# Patient Record
Sex: FEMALE | Race: WHITE | URBAN - METROPOLITAN AREA
[De-identification: names, ages, dates, MRNs, and addresses within clinical notes are randomized per-mention and may not be internally consistent; named-entity substitution may affect disease eponyms.]

---

## 2020-12-15 ENCOUNTER — OFFICE VISIT (OUTPATIENT)
Dept: URBAN - METROPOLITAN AREA CLINIC 36 | Facility: CLINIC | Age: 75
End: 2020-12-15
Payer: MEDICARE

## 2020-12-15 DIAGNOSIS — H34.8310 TRIBUTARY (BRANCH) RETINAL VEIN OCCLUSION, RIGHT EYE, WITH MACULAR EDEMA: Primary | ICD-10-CM

## 2020-12-15 PROCEDURE — 92014 COMPRE OPH EXAM EST PT 1/>: CPT | Performed by: OPHTHALMOLOGY

## 2020-12-15 PROCEDURE — 92134 CPTRZ OPH DX IMG PST SGM RTA: CPT | Performed by: OPHTHALMOLOGY

## 2020-12-15 PROCEDURE — 67028 INJECTION EYE DRUG: CPT | Performed by: OPHTHALMOLOGY

## 2020-12-15 ASSESSMENT — INTRAOCULAR PRESSURE
OD: 15
OS: 12

## 2020-12-15 NOTE — IMPRESSION/PLAN
Impression: Dry eye syndrome of bilateral lacrimal glands: H04.123. Plan: Patient notes blurring and tearing. Exam demonstrates PEE.   Rec ATs

## 2020-12-15 NOTE — IMPRESSION/PLAN
Impression: Glaucoma: H40.9. OU. Status: Controlled. Plan: IOP stable on drops. No progression of ON appearance. Cont. care with Dr. Felisha Sandoval.

## 2020-12-15 NOTE — IMPRESSION/PLAN
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 OD. Status: Symptomatic.
-s/p Avastin last 02/14/18
-s/p Eylea last 08/25/20 Plan: Exam and OCT reveal worse CME today s/p Eylea 4 mo ago. Discussed R/B/A of tx vs observation. Patient elects continuing with [facility] today and will taper to 3 mo to prevent recurrence. R/B/A's discussed. Patient elects to proceed. The patient elects to proceed with [facility], which was performed in the clinic without complication.  

Return to clinic 3 mo for DFE with OCt,OU/poss Eylea OD x BRVO

## 2021-03-16 ENCOUNTER — OFFICE VISIT (OUTPATIENT)
Dept: URBAN - METROPOLITAN AREA CLINIC 36 | Facility: CLINIC | Age: 76
End: 2021-03-16
Payer: MEDICARE

## 2021-03-16 PROCEDURE — 92012 INTRM OPH EXAM EST PATIENT: CPT | Performed by: OPHTHALMOLOGY

## 2021-03-16 PROCEDURE — 67028 INJECTION EYE DRUG: CPT | Performed by: OPHTHALMOLOGY

## 2021-03-16 PROCEDURE — 92134 CPTRZ OPH DX IMG PST SGM RTA: CPT | Performed by: OPHTHALMOLOGY

## 2021-03-16 ASSESSMENT — INTRAOCULAR PRESSURE
OS: 12
OD: 13

## 2021-03-16 NOTE — IMPRESSION/PLAN
Impression: Glaucoma: H40.9. OU. Status: Controlled. Plan: IOP stable on drops. No progression of ON appearance. Cont. care with Dr. Cha Patton.

## 2021-03-16 NOTE — IMPRESSION/PLAN
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 OD. Status: Symptomatic.
-s/p Avastin last 02/14/18
-s/p Eylea last 12/15/20 Plan: Exam and OCT reveal improved CME today tapering s/p Eylea from 4 mo to 3 mo. Discussed R/B/A of tx vs observation. Patient elects continuing with [facility] today and will taper to 3 mo to prevent recurrence. R/B/A's discussed. Patient elects to proceed. The patient elects to proceed with [facility], which was performed in the clinic without complication.  

Return to clinic 3 mo for DFE with OCt,OU/poss Eylea OD x BRVO

## 2021-06-15 ENCOUNTER — OFFICE VISIT (OUTPATIENT)
Dept: URBAN - METROPOLITAN AREA CLINIC 36 | Facility: CLINIC | Age: 76
End: 2021-06-15
Payer: MEDICARE

## 2021-06-15 PROCEDURE — 92134 CPTRZ OPH DX IMG PST SGM RTA: CPT | Performed by: OPHTHALMOLOGY

## 2021-06-15 PROCEDURE — 92014 COMPRE OPH EXAM EST PT 1/>: CPT | Performed by: OPHTHALMOLOGY

## 2021-06-15 PROCEDURE — 67028 INJECTION EYE DRUG: CPT | Performed by: OPHTHALMOLOGY

## 2021-06-15 RX ORDER — LATANOPROST 50 UG/ML
0.005 % SOLUTION OPHTHALMIC
Qty: 5 | Refills: 3 | Status: INACTIVE
Start: 2021-06-15 | End: 2021-08-30

## 2021-06-15 ASSESSMENT — INTRAOCULAR PRESSURE
OS: 11
OD: 14

## 2021-06-15 NOTE — IMPRESSION/PLAN
Impression: Glaucoma: H40.9. OU. Status: Controlled. Plan: IOP stable on drops. No progression of ON appearance. Cont. care with Dr. Dedra Rivera.

## 2021-06-15 NOTE — IMPRESSION/PLAN
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 OD. Status: Symptomatic.
-s/p Avastin last 02/14/18
-s/p Eylea last 03/16/2021 Plan: Exam and OCT reveal worse CME today tapering s/p Eylea from 3 mo. Discussed R/B/A of tx vs observation. Patient elects continuing with [facility] today and will taper to 10 wks to prevent recurrence. R/B/A's discussed. Patient elects to proceed. The patient elects to proceed with [facility], which was performed in the clinic without complication.  

Return to clinic 10 wks for DFE with OCt,OU/poss Eylea OD x BRVO

## 2021-08-24 ENCOUNTER — OFFICE VISIT (OUTPATIENT)
Dept: URBAN - METROPOLITAN AREA CLINIC 36 | Facility: CLINIC | Age: 76
End: 2021-08-24
Payer: MEDICARE

## 2021-08-24 DIAGNOSIS — H02.409 PTOSIS OF EYELID: ICD-10-CM

## 2021-08-24 PROCEDURE — 67028 INJECTION EYE DRUG: CPT | Performed by: OPHTHALMOLOGY

## 2021-08-24 PROCEDURE — 92134 CPTRZ OPH DX IMG PST SGM RTA: CPT | Performed by: OPHTHALMOLOGY

## 2021-08-24 PROCEDURE — 92014 COMPRE OPH EXAM EST PT 1/>: CPT | Performed by: OPHTHALMOLOGY

## 2021-08-24 ASSESSMENT — INTRAOCULAR PRESSURE
OD: 13
OS: 13

## 2021-08-24 NOTE — IMPRESSION/PLAN
Impression: Glaucoma: H40.9. OU. Status: Controlled. Plan: IOP stable on drops. No progression of ON appearance. Cont. care with Dr. Олег Callahan.

## 2021-08-24 NOTE — IMPRESSION/PLAN
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 OD. Status: Symptomatic.
-s/p Avastin last 02/14/18
-s/p Eylea last 03/16/2021 Plan: Exam and OCT reveal improved CME today tapering s/p Eylea from 3 mo to 10 wks. Discussed R/B/A of tx vs observation. Patient elects continuing with [facility] today and will continue with 10 wks to prevent recurrence. R/B/A's discussed. Patient elects to proceed. The patient elects to proceed with [facility], which was performed in the clinic without complication.  

Return to clinic 10 wks for DFE with OCt,OU/poss Eylea OD x BRVO

## 2021-08-24 NOTE — IMPRESSION/PLAN
Impression: Presence of intraocular lens: Z96.1. Plan: Patient notes a decline in vision. Exam demonstrates a worse PCO.   Rec follow up for YAG laser OS>OD

## 2021-08-24 NOTE — IMPRESSION/PLAN
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  Plan: s/p repair (Dr. Carlos Eduardo Bedoya); looks great

## 2021-09-29 ENCOUNTER — OFFICE VISIT (OUTPATIENT)
Dept: URBAN - METROPOLITAN AREA CLINIC 37 | Facility: CLINIC | Age: 76
End: 2021-09-29
Payer: MEDICARE

## 2021-09-29 DIAGNOSIS — H40.1133 PRIMARY OPEN-ANGLE GLAUCOMA, BILATERAL, SEVERE STAGE: ICD-10-CM

## 2021-09-29 PROCEDURE — 99204 OFFICE O/P NEW MOD 45 MIN: CPT | Performed by: OPTOMETRIST

## 2021-09-29 ASSESSMENT — INTRAOCULAR PRESSURE
OS: 11
OD: 11

## 2021-09-29 ASSESSMENT — VISUAL ACUITY
OD: 20/25
OS: 20/30

## 2021-09-29 NOTE — IMPRESSION/PLAN
Impression: Primary open-angle glaucoma, bilateral, severe stage: V59.7721. Plan: cont w gtts ans stay w Dr Micheal Gaitan.

## 2021-09-29 NOTE — IMPRESSION/PLAN
Impression: Other secondary cataract, bilateral: H26.493. Plan: refer for YAG OS >>OD.   Decreased VA OS sec to PCO

## 2021-11-08 ENCOUNTER — OFFICE VISIT (OUTPATIENT)
Dept: URBAN - METROPOLITAN AREA CLINIC 36 | Facility: CLINIC | Age: 76
End: 2021-11-08
Payer: MEDICARE

## 2021-11-08 DIAGNOSIS — Z96.1 PRESENCE OF INTRAOCULAR LENS: ICD-10-CM

## 2021-11-08 DIAGNOSIS — H04.123 DRY EYE SYNDROME OF BILATERAL LACRIMAL GLANDS: ICD-10-CM

## 2021-11-08 PROCEDURE — 92014 COMPRE OPH EXAM EST PT 1/>: CPT | Performed by: OPHTHALMOLOGY

## 2021-11-08 PROCEDURE — 92134 CPTRZ OPH DX IMG PST SGM RTA: CPT | Performed by: OPHTHALMOLOGY

## 2021-11-08 PROCEDURE — 67028 INJECTION EYE DRUG: CPT | Performed by: OPHTHALMOLOGY

## 2021-11-08 ASSESSMENT — INTRAOCULAR PRESSURE
OD: 14
OS: 13

## 2021-11-08 NOTE — IMPRESSION/PLAN
Impression: Glaucoma: H40.9. OU. Status: Controlled. Plan: IOP stable on drops. No progression of ON appearance. Cont. care with Dr. Eric Boykin.

## 2021-11-08 NOTE — IMPRESSION/PLAN
Impression: Presence of intraocular lens: Z96.1. Plan: Patient notes a decline in vision. Exam demonstrates a worse PCO.   Cleared from retina standpoint to proceed with YAG laser OU

## 2021-11-08 NOTE — IMPRESSION/PLAN
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 OD. Status: Symptomatic.
-s/p Avastin last 02/14/18
-s/p Eylea last 08/24/2021 Plan: Exam and OCT reveal recurrence of CME today s/p Eylea extending from 10wks to 11 wks. Discussed R/B/A of tx vs observation. Patient elects continuing with [facility] today and will taper with 10 wks to prevent recurrence. R/B/A's discussed. Patient elects to proceed. The patient elects to proceed with [facility], which was performed in the clinic without complication.  

Return to clinic 10 wks for DFE with OCT OU/poss Eylea OD x BRVO

## 2021-11-18 ENCOUNTER — TESTING ONLY (OUTPATIENT)
Dept: URBAN - METROPOLITAN AREA CLINIC 33 | Facility: CLINIC | Age: 76
End: 2021-11-18
Payer: MEDICARE

## 2021-11-18 DIAGNOSIS — H26.492 OTHER SECONDARY CATARACT, LEFT EYE: ICD-10-CM

## 2021-11-18 PROCEDURE — 99203 OFFICE O/P NEW LOW 30 MIN: CPT | Performed by: OPHTHALMOLOGY

## 2021-11-18 RX ORDER — PREDNISOLONE ACETATE 10 MG/ML
1 % SUSPENSION/ DROPS OPHTHALMIC
Qty: 10 | Refills: 1 | Status: ACTIVE
Start: 2021-11-18

## 2021-11-18 ASSESSMENT — INTRAOCULAR PRESSURE
OS: 15
OD: 15

## 2021-11-18 NOTE — IMPRESSION/PLAN
Impression: Other secondary cataract, bilateral: H26.493. Plan: Posterior capsular opacification present on lens implant and causing decreased vision. Proceed with YAG capsulotomy treatment  OS then OD . Discussed R/B/I  with patient. 
 USE Pred QID for 1 week after Laser Procedure ( each eye) 0 = independent

## 2022-01-17 ENCOUNTER — OFFICE VISIT (OUTPATIENT)
Dept: URBAN - METROPOLITAN AREA CLINIC 36 | Facility: CLINIC | Age: 77
End: 2022-01-17
Payer: MEDICARE

## 2022-01-17 DIAGNOSIS — H40.9 GLAUCOMA: ICD-10-CM

## 2022-01-17 PROCEDURE — 67028 INJECTION EYE DRUG: CPT | Performed by: OPHTHALMOLOGY

## 2022-01-17 PROCEDURE — 92134 CPTRZ OPH DX IMG PST SGM RTA: CPT | Performed by: OPHTHALMOLOGY

## 2022-01-17 PROCEDURE — 92014 COMPRE OPH EXAM EST PT 1/>: CPT | Performed by: OPHTHALMOLOGY

## 2022-01-17 ASSESSMENT — INTRAOCULAR PRESSURE
OD: 12
OS: 12

## 2022-01-17 NOTE — IMPRESSION/PLAN
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 OD. Status: Symptomatic.
-s/p Avastin last 02/14/18
-s/p Eylea last 08/24/2021 Plan: Exam and OCT reveal improved but persistent CME today s/p Eylea tapering from 11 wks to 10 wks. Discussed R/B/A of tx vs observation. Patient elects continuing with [facility] today and will taper to 9 wks to prevent recurrence. R/B/A's discussed. Patient elects to proceed. The patient elects to proceed with [facility], which was performed in the clinic without complication.  

Return to clinic 9 wks for DFE with OCT OU/poss Eylea OD x BRVO

## 2022-01-17 NOTE — IMPRESSION/PLAN
Impression: Presence of intraocular lens: Z96.1. Plan: Patient notes a decline in vision. Exam demonstrates a worse PCO. Cleared from retina standpoint to proceed with YAG laser OU. Retina appears healthy enough to achieve better vision but for the PCO.

## 2022-01-21 ENCOUNTER — TESTING ONLY (OUTPATIENT)
Dept: URBAN - METROPOLITAN AREA CLINIC 33 | Facility: CLINIC | Age: 77
End: 2022-01-21
Payer: MEDICARE

## 2022-01-21 DIAGNOSIS — H26.493 OTHER SECONDARY CATARACT, BILATERAL: Primary | ICD-10-CM

## 2022-01-21 PROCEDURE — V2799 MISC VISION ITEM OR SERVICE: HCPCS | Performed by: OPTOMETRIST

## 2022-01-21 ASSESSMENT — KERATOMETRY
OS: 45.13
OD: 45.13

## 2022-01-21 ASSESSMENT — INTRAOCULAR PRESSURE
OS: 13
OD: 10

## 2022-01-21 ASSESSMENT — VISUAL ACUITY
OD: 20/70
OS: 20/100

## 2022-01-21 NOTE — IMPRESSION/PLAN
Impression: Other secondary cataract, bilateral: H26.493. Plan: Posterior capsular opacification present on lens implant and causing decreased vision. Educated patient about the pathophysiology of today's findings and recommend YAG capsulotomy treatment with cataract surgeon. Recommend YAG OS then OD.

## 2022-02-15 ENCOUNTER — SURGERY (OUTPATIENT)
Dept: URBAN - METROPOLITAN AREA SURGERY 15 | Facility: SURGERY | Age: 77
End: 2022-02-15
Payer: MEDICARE

## 2022-02-15 PROCEDURE — 66821 AFTER CATARACT LASER SURGERY: CPT | Performed by: OPHTHALMOLOGY

## 2022-02-22 ENCOUNTER — POST-OPERATIVE VISIT (OUTPATIENT)
Dept: URBAN - METROPOLITAN AREA CLINIC 33 | Facility: CLINIC | Age: 77
End: 2022-02-22

## 2022-02-22 DIAGNOSIS — Z48.810 ENCOUNTER FOR SURGICAL AFTERCARE FOLLOWING SURGERY ON A SENSE ORGAN: Primary | ICD-10-CM

## 2022-02-22 PROCEDURE — 99024 POSTOP FOLLOW-UP VISIT: CPT | Performed by: OPTOMETRIST

## 2022-02-22 ASSESSMENT — INTRAOCULAR PRESSURE
OS: 15
OD: 14

## 2022-02-22 NOTE — IMPRESSION/PLAN
Impression: S/P YAG Capsulotomy (Yttrium Aluminum [place]) OS - 7 Days. Encounter for surgical aftercare following surgery on a sense organ  Z48.810. Plan: Keep YAG OD --Advised patient to use artificial tears for comfort.

## 2022-03-21 ENCOUNTER — OFFICE VISIT (OUTPATIENT)
Dept: URBAN - METROPOLITAN AREA CLINIC 36 | Facility: CLINIC | Age: 77
End: 2022-03-21
Payer: MEDICARE

## 2022-03-21 PROCEDURE — 92012 INTRM OPH EXAM EST PATIENT: CPT | Performed by: OPHTHALMOLOGY

## 2022-03-21 PROCEDURE — 67028 INJECTION EYE DRUG: CPT | Performed by: OPHTHALMOLOGY

## 2022-03-21 PROCEDURE — 92134 CPTRZ OPH DX IMG PST SGM RTA: CPT | Performed by: OPHTHALMOLOGY

## 2022-03-21 ASSESSMENT — INTRAOCULAR PRESSURE
OS: 16
OD: 17

## 2022-03-21 NOTE — IMPRESSION/PLAN
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 OD. Status: Symptomatic.
-s/p Avastin last 02/14/18
-s/p Eylea last 01/17/22 Plan: Exam and OCT reveal improved but persistent CME today s/p Eylea tapering from 11 wks to 10 wks. Discussed R/B/A of tx vs observation. Patient elects continuing with [facility] today and will taper to 9 wks to prevent recurrence. R/B/A's discussed. Patient elects to proceed. The patient elects to proceed with [facility], which was performed in the clinic without complication.  

Return to clinic 9 wks for DFE with OCT OU/poss Eylea OD x BRVO

## 2022-03-21 NOTE — IMPRESSION/PLAN
Impression: Presence of intraocular lens: Z96.1.
s/p YAG OS 02/15/22 Plan: Lens centered.  Cleared to proceed with YAG OD.

## 2022-03-21 NOTE — IMPRESSION/PLAN
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
-s/p repair OS (Dr. Rony Patterson) for asymmetry Plan: Exam demonstrates dermatochalasis with ptosis OU. This may be starting to become visually significant as patient is manually trying to raise her eyelids. Will refer back to Dr. Jonn Singer for evaluation.

## 2022-03-21 NOTE — IMPRESSION/PLAN
Impression: Glaucoma: H40.9. OU. Status: Controlled. Plan: IOP stable on drops. No progression of ON appearance. Cont. care with Dr. Yesenia Mandujano.

## 2022-05-23 ENCOUNTER — OFFICE VISIT (OUTPATIENT)
Dept: URBAN - METROPOLITAN AREA CLINIC 36 | Facility: CLINIC | Age: 77
End: 2022-05-23
Payer: MEDICARE

## 2022-05-23 DIAGNOSIS — H04.123 DRY EYE SYNDROME OF BILATERAL LACRIMAL GLANDS: ICD-10-CM

## 2022-05-23 DIAGNOSIS — H40.9 GLAUCOMA: ICD-10-CM

## 2022-05-23 DIAGNOSIS — Z96.1 PRESENCE OF INTRAOCULAR LENS: ICD-10-CM

## 2022-05-23 DIAGNOSIS — H02.409 PTOSIS OF EYELID: ICD-10-CM

## 2022-05-23 DIAGNOSIS — H34.8310 TRIB RTNL VEIN OCCLUSION, RIGHT EYE, WITH MACULAR EDEMA: Primary | ICD-10-CM

## 2022-05-23 PROCEDURE — 92012 INTRM OPH EXAM EST PATIENT: CPT | Performed by: OPHTHALMOLOGY

## 2022-05-23 PROCEDURE — 92134 CPTRZ OPH DX IMG PST SGM RTA: CPT | Performed by: OPHTHALMOLOGY

## 2022-05-23 PROCEDURE — 67028 INJECTION EYE DRUG: CPT | Performed by: OPHTHALMOLOGY

## 2022-05-23 ASSESSMENT — INTRAOCULAR PRESSURE
OD: 16
OS: 13

## 2022-05-23 NOTE — IMPRESSION/PLAN
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 OD. Status: Symptomatic.
-s/p Avastin last 02/14/18
-s/p Eylea last 01/17/22 Plan: Exam and OCT reveal improved but persistent CME today s/p Eylea tapering from 11 wks to 10 wks. Discussed R/B/A of tx vs observation. Patient elects continuing with [facility] today and will taper to 9 wks to prevent recurrence. R/B/A's discussed. Patient elects to proceed. The patient elects to proceed with [facility], which was performed in the clinic without complication.  

Return to clinic 9 wks for DFE with OCT OU/poss Eylea OD x BRVO

## 2022-05-23 NOTE — IMPRESSION/PLAN
Impression: Glaucoma: H40.9. OU. Status: Controlled. Plan: IOP stable on latanoprost. No progression of ON appearance. Cont. care with Dr. Darci Rangel.

## 2022-05-23 NOTE — IMPRESSION/PLAN
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
-s/p repair OS (Dr. Graham Pires) for asymmetry Plan: Exam demonstrates dermatochalasis with ptosis OU. This may be starting to become visually significant as patient is manually trying to raise her eyelids. Will refer back to Dr. Sandra Michaels for evaluation.

## 2022-05-24 ENCOUNTER — SURGERY (OUTPATIENT)
Dept: URBAN - METROPOLITAN AREA SURGERY 15 | Facility: SURGERY | Age: 77
End: 2022-05-24
Payer: MEDICARE

## 2022-05-24 PROCEDURE — 66821 AFTER CATARACT LASER SURGERY: CPT | Performed by: OPHTHALMOLOGY

## 2022-05-31 ENCOUNTER — POST-OPERATIVE VISIT (OUTPATIENT)
Dept: URBAN - METROPOLITAN AREA CLINIC 33 | Facility: CLINIC | Age: 77
End: 2022-05-31
Payer: MEDICARE

## 2022-05-31 DIAGNOSIS — Z48.810 ENCOUNTER FOR SURGICAL AFTERCARE FOLLOWING SURGERY ON A SENSE ORGAN: Primary | ICD-10-CM

## 2022-05-31 PROCEDURE — 99024 POSTOP FOLLOW-UP VISIT: CPT | Performed by: OPTOMETRIST

## 2022-05-31 ASSESSMENT — INTRAOCULAR PRESSURE
OD: 16
OS: 16

## 2022-05-31 ASSESSMENT — VISUAL ACUITY: OD: 20/30

## 2022-05-31 NOTE — IMPRESSION/PLAN
Impression: S/P YAG Capsulotomy (Yttrium Aluminum [place]) OD - 7 Days. Encounter for surgical aftercare following surgery on a sense organ  Z48.810. Patient reports improved vision. Continue care with Dr. Josue Lopez.  Plan: D/C Prednisolone

## 2022-07-25 ENCOUNTER — OFFICE VISIT (OUTPATIENT)
Dept: URBAN - METROPOLITAN AREA CLINIC 36 | Facility: CLINIC | Age: 77
End: 2022-07-25
Payer: MEDICARE

## 2022-07-25 DIAGNOSIS — H34.8310 TRIB RTNL VEIN OCCLUSION, RIGHT EYE, WITH MACULAR EDEMA: Primary | ICD-10-CM

## 2022-07-25 DIAGNOSIS — H40.9 GLAUCOMA: ICD-10-CM

## 2022-07-25 DIAGNOSIS — H02.409 PTOSIS OF EYELID: ICD-10-CM

## 2022-07-25 DIAGNOSIS — Z96.1 PRESENCE OF INTRAOCULAR LENS: ICD-10-CM

## 2022-07-25 DIAGNOSIS — H04.123 DRY EYE SYNDROME OF BILATERAL LACRIMAL GLANDS: ICD-10-CM

## 2022-07-25 PROCEDURE — 92012 INTRM OPH EXAM EST PATIENT: CPT | Performed by: OPHTHALMOLOGY

## 2022-07-25 PROCEDURE — 67028 INJECTION EYE DRUG: CPT | Performed by: OPHTHALMOLOGY

## 2022-07-25 PROCEDURE — 92134 CPTRZ OPH DX IMG PST SGM RTA: CPT | Performed by: OPHTHALMOLOGY

## 2022-07-25 ASSESSMENT — INTRAOCULAR PRESSURE
OS: 11
OD: 12

## 2022-07-25 NOTE — IMPRESSION/PLAN
Impression: Glaucoma: H40.9. OU. Status: Controlled. Plan: IOP stable on latanoprost. No progression of ON appearance. Cont. care with Dr. Tati Reeves.

## 2022-07-25 NOTE — IMPRESSION/PLAN
Impression: Presence of intraocular lens: Z96.1.
s/p YAG OS 02/15/22, OD 5/24/2022 Plan: Lens centered.  Vision improved after YAG

## 2022-07-25 NOTE — IMPRESSION/PLAN
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 OD. Status: Symptomatic.
-s/p Avastin last 02/14/18
-s/p Eylea last 05/23/2022 Plan: Exam and OCT reveal improved but persistent CME today s/p Eylea tapering from 11 wks to 10 wks. Discussed R/B/A of tx vs observation. Patient elects continuing with [facility] today and will taper to 9 wks to prevent recurrence. R/B/A's discussed. Patient elects to proceed. The patient elects to proceed with [facility], which was performed in the clinic without complication.  

Return to clinic 9 wks for DFE with OCT OU/poss Eylea OD x BRVO

## 2022-07-25 NOTE — IMPRESSION/PLAN
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
-s/p repair OS (Dr. Marcos Yang) for asymmetry Plan: Exam demonstrates dermatochalasis with ptosis OU. This may be starting to become visually significant as patient is manually trying to raise her eyelids. Will refer back to Dr. Nelson Diaz for evaluation.

## 2022-09-26 ENCOUNTER — OFFICE VISIT (OUTPATIENT)
Dept: URBAN - METROPOLITAN AREA CLINIC 36 | Facility: CLINIC | Age: 77
End: 2022-09-26
Payer: MEDICARE

## 2022-09-26 DIAGNOSIS — H34.8310 TRIB RTNL VEIN OCCLUSION, RIGHT EYE, WITH MACULAR EDEMA: Primary | ICD-10-CM

## 2022-09-26 DIAGNOSIS — H02.409 PTOSIS OF EYELID: ICD-10-CM

## 2022-09-26 DIAGNOSIS — H40.9 GLAUCOMA: ICD-10-CM

## 2022-09-26 DIAGNOSIS — H04.123 DRY EYE SYNDROME OF BILATERAL LACRIMAL GLANDS: ICD-10-CM

## 2022-09-26 DIAGNOSIS — Z96.1 PRESENCE OF INTRAOCULAR LENS: ICD-10-CM

## 2022-09-26 PROCEDURE — 92134 CPTRZ OPH DX IMG PST SGM RTA: CPT | Performed by: OPHTHALMOLOGY

## 2022-09-26 PROCEDURE — 99214 OFFICE O/P EST MOD 30 MIN: CPT | Performed by: OPHTHALMOLOGY

## 2022-09-26 PROCEDURE — 67028 INJECTION EYE DRUG: CPT | Performed by: OPHTHALMOLOGY

## 2022-09-26 ASSESSMENT — INTRAOCULAR PRESSURE
OS: 15
OD: 14

## 2022-09-26 NOTE — IMPRESSION/PLAN
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
-s/p repair OS (Dr. Sera Westbrook) for asymmetry Plan: Exam demonstrates dermatochalasis with ptosis OU. This may be starting to become visually significant as patient is manually trying to raise her eyelids. Will refer back to Dr. Lyle Graves for evaluation.

## 2022-09-26 NOTE — IMPRESSION/PLAN
Impression: Glaucoma: H40.9. OU. Status: Controlled. Plan: IOP stable on latanoprost. No progression of ON appearance. Cont. care with Dr. Yessica Brown.

## 2022-09-26 NOTE — IMPRESSION/PLAN
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 OD. Status: Symptomatic.
-s/p Avastin last 02/14/18
-s/p Eylea last 07/25/2022 Plan: Exam and OCT reveal improved but persistent CME today s/p Eylea tapering from 10 wks to 9 wks. Discussed R/B/A of tx vs observation. Patient elects continuing with [facility] today and will maintain 9 wks to prevent recurrence. R/B/A's discussed. Patient elects to proceed. The patient elects to proceed with [facility], which was performed in the clinic without complication.  

Return to clinic 9 wks for DFE with OCT OU/poss Eylea OD x BRVO

## 2022-11-28 ENCOUNTER — OFFICE VISIT (OUTPATIENT)
Dept: URBAN - METROPOLITAN AREA CLINIC 36 | Facility: CLINIC | Age: 77
End: 2022-11-28
Payer: MEDICARE

## 2022-11-28 DIAGNOSIS — H04.123 DRY EYE SYNDROME OF BILATERAL LACRIMAL GLANDS: ICD-10-CM

## 2022-11-28 DIAGNOSIS — H34.8310 TRIB RTNL VEIN OCCLUSION, RIGHT EYE, WITH MACULAR EDEMA: Primary | ICD-10-CM

## 2022-11-28 DIAGNOSIS — H40.9 GLAUCOMA: ICD-10-CM

## 2022-11-28 DIAGNOSIS — Z96.1 PRESENCE OF INTRAOCULAR LENS: ICD-10-CM

## 2022-11-28 DIAGNOSIS — H02.409 PTOSIS OF EYELID: ICD-10-CM

## 2022-11-28 PROCEDURE — 99214 OFFICE O/P EST MOD 30 MIN: CPT | Performed by: OPHTHALMOLOGY

## 2022-11-28 PROCEDURE — 92134 CPTRZ OPH DX IMG PST SGM RTA: CPT | Performed by: OPHTHALMOLOGY

## 2022-11-28 PROCEDURE — 67028 INJECTION EYE DRUG: CPT | Performed by: OPHTHALMOLOGY

## 2022-11-28 ASSESSMENT — INTRAOCULAR PRESSURE
OD: 15
OS: 19

## 2022-11-28 NOTE — IMPRESSION/PLAN
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
-s/p repair OS (Dr. Sidra Voss) for asymmetry Plan: Exam demonstrates dermatochalasis with ptosis OU. This may be starting to become visually significant as patient is manually trying to raise her eyelids. Will refer back to Dr. Castillo Bull for evaluation.

## 2022-11-28 NOTE — IMPRESSION/PLAN
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 OD. Status: Symptomatic.
-s/p Avastin last 02/14/18
-s/p Eylea last 09/26/2022 Plan: Exam and OCT reveal improved but persistent CME today s/p Eylea tapering from 10 wks to 9 wks. Discussed R/B/A of tx vs observation. Patient elects continuing with [facility] today and will taper to 8 wks to prevent recurrence. R/B/A's discussed. Patient elects to proceed. The patient elects to proceed with [facility], which was performed in the clinic without complication.  

Return to clinic 8wks for DFE with OCT OU (mac and RNFL)/poss Eylea OD x BRVO

## 2022-11-28 NOTE — IMPRESSION/PLAN
Impression: Glaucoma: H40.9. OU. Status: Controlled. Plan: IOP stable on latanoprost. No progression of ON appearance. Cont. care with Dr. Reinaldo Ramey.

## 2023-01-23 ENCOUNTER — OFFICE VISIT (OUTPATIENT)
Dept: URBAN - METROPOLITAN AREA CLINIC 36 | Facility: CLINIC | Age: 78
End: 2023-01-23
Payer: MEDICARE

## 2023-01-23 DIAGNOSIS — H40.9 GLAUCOMA: ICD-10-CM

## 2023-01-23 DIAGNOSIS — Z96.1 PRESENCE OF INTRAOCULAR LENS: ICD-10-CM

## 2023-01-23 DIAGNOSIS — H34.8310 TRIB RTNL VEIN OCCLUSION, RIGHT EYE, WITH MACULAR EDEMA: Primary | ICD-10-CM

## 2023-01-23 DIAGNOSIS — H02.409 PTOSIS OF EYELID: ICD-10-CM

## 2023-01-23 DIAGNOSIS — H04.123 DRY EYE SYNDROME OF BILATERAL LACRIMAL GLANDS: ICD-10-CM

## 2023-01-23 PROCEDURE — 92012 INTRM OPH EXAM EST PATIENT: CPT | Performed by: OPHTHALMOLOGY

## 2023-01-23 PROCEDURE — 67028 INJECTION EYE DRUG: CPT | Performed by: OPHTHALMOLOGY

## 2023-01-23 PROCEDURE — 92134 CPTRZ OPH DX IMG PST SGM RTA: CPT | Performed by: OPHTHALMOLOGY

## 2023-01-23 ASSESSMENT — INTRAOCULAR PRESSURE
OS: 19
OD: 18

## 2023-01-23 NOTE — IMPRESSION/PLAN
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
-s/p repair OS (Dr. Catia Reyes) for asymmetry Plan: Exam demonstrates dermatochalasis with improved ptosis OU. Patient remains very happy with the results.

## 2023-01-23 NOTE — IMPRESSION/PLAN
Impression: Glaucoma: H40.9. OU. Status: Controlled. Plan: IOP stable on latanoprost. No progression of ON appearance. Cont. care with Dr. Grabiel Lynch.

## 2023-01-23 NOTE — IMPRESSION/PLAN
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 OD. Status: Symptomatic.
-s/p Avastin last 02/14/18
-s/p Eylea last 11/28/2022 Plan: Exam and OCT reveal improved but persistent CME today s/p Eylea tapering from 10 wks to 9 wks and now to 8 wks. Discussed R/B/A of tx vs observation. Patient elects continuing with [facility] today and will maintain 8 wks to prevent recurrence. R/B/A's discussed. Patient elects to proceed. The patient elects to proceed with Anita Tafoya, which was performed in the clinic without complication.  

Return to clinic 8wks for DFE with OCT OU (mac and RNFL)/poss Eylea OD x BRVO

## 2023-03-20 ENCOUNTER — OFFICE VISIT (OUTPATIENT)
Dept: URBAN - METROPOLITAN AREA CLINIC 36 | Facility: CLINIC | Age: 78
End: 2023-03-20
Payer: MEDICARE

## 2023-03-20 DIAGNOSIS — H04.123 DRY EYE SYNDROME OF BILATERAL LACRIMAL GLANDS: ICD-10-CM

## 2023-03-20 DIAGNOSIS — H02.409 PTOSIS OF EYELID: ICD-10-CM

## 2023-03-20 DIAGNOSIS — H40.9 GLAUCOMA: ICD-10-CM

## 2023-03-20 DIAGNOSIS — Z96.1 PRESENCE OF INTRAOCULAR LENS: ICD-10-CM

## 2023-03-20 DIAGNOSIS — H34.8310 TRIB RTNL VEIN OCCLUSION, RIGHT EYE, WITH MACULAR EDEMA: Primary | ICD-10-CM

## 2023-03-20 PROCEDURE — 67028 INJECTION EYE DRUG: CPT | Performed by: OPHTHALMOLOGY

## 2023-03-20 PROCEDURE — 92134 CPTRZ OPH DX IMG PST SGM RTA: CPT | Performed by: OPHTHALMOLOGY

## 2023-03-20 PROCEDURE — 92012 INTRM OPH EXAM EST PATIENT: CPT | Performed by: OPHTHALMOLOGY

## 2023-03-20 RX ORDER — LATANOPROST 50 UG/ML
0.005 % SOLUTION OPHTHALMIC
Qty: 5 | Refills: 11 | Status: INACTIVE
Start: 2023-03-20 | End: 2023-03-24

## 2023-03-20 ASSESSMENT — INTRAOCULAR PRESSURE
OD: 17
OS: 17

## 2023-03-20 NOTE — IMPRESSION/PLAN
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 OD. Status: Symptomatic.
-s/p Avastin last 02/14/18
-s/p Eylea last 01/23/2023 Plan: Exam and OCT reveal improved but persistent CME today s/p Eylea tapering from 10 wks to 9 wks and now at 8 wk interval. Discussed R/B/A of tx vs observation. Patient elects continuing with Erin Ramachandran today and will maintain 8 wks to prevent recurrence. R/B/A's discussed. Patient elects to proceed. The patient elects to proceed with Erin Ramachandran, which was performed in the clinic without complication.  

Return to clinic 8wks for DFE with OCT OU (mac and RNFL)/poss Eylea OD x BRVO

## 2023-03-20 NOTE — IMPRESSION/PLAN
Impression: Glaucoma: H40.9. OU. Status: Controlled. Plan: IOP stable on latanoprost. No progression of ON appearance. Cont. care with Dr. Dakota Goldstein.   RNFL ~71 OU with tr thinning 3/20/2023

## 2023-03-20 NOTE — IMPRESSION/PLAN
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
-s/p repair OS (Dr. Laura Marshall) for asymmetry Plan: Exam demonstrates dermatochalasis with improved ptosis OU. Patient remains very happy with the results.

## 2023-05-15 ENCOUNTER — OFFICE VISIT (OUTPATIENT)
Dept: URBAN - METROPOLITAN AREA CLINIC 36 | Facility: CLINIC | Age: 78
End: 2023-05-15
Payer: MEDICARE

## 2023-05-15 DIAGNOSIS — H40.9 GLAUCOMA: ICD-10-CM

## 2023-05-15 DIAGNOSIS — H04.123 DRY EYE SYNDROME OF BILATERAL LACRIMAL GLANDS: ICD-10-CM

## 2023-05-15 DIAGNOSIS — H02.409 PTOSIS OF EYELID: ICD-10-CM

## 2023-05-15 DIAGNOSIS — H34.8310 TRIB RTNL VEIN OCCLUSION, RIGHT EYE, WITH MACULAR EDEMA: Primary | ICD-10-CM

## 2023-05-15 DIAGNOSIS — Z96.1 PRESENCE OF INTRAOCULAR LENS: ICD-10-CM

## 2023-05-15 PROCEDURE — 92012 INTRM OPH EXAM EST PATIENT: CPT | Performed by: OPHTHALMOLOGY

## 2023-05-15 PROCEDURE — 67028 INJECTION EYE DRUG: CPT | Performed by: OPHTHALMOLOGY

## 2023-05-15 PROCEDURE — 92134 CPTRZ OPH DX IMG PST SGM RTA: CPT | Performed by: OPHTHALMOLOGY

## 2023-05-15 ASSESSMENT — INTRAOCULAR PRESSURE
OD: 16
OS: 14

## 2023-05-15 NOTE — IMPRESSION/PLAN
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 OD. Status: Symptomatic.
-s/p Avastin last 02/14/18
-s/p Eylea last 03/20/2023 Plan: Exam and OCT reveal improved but persistent CME today s/p Eylea tapering from 10 wks to 8 wk interval. Discussed R/B/A of tx vs observation. Patient elects continuing with [facility] today and will maintain 8 wks to prevent recurrence. R/B/A's discussed. Patient elects to proceed. The patient elects to proceed with [facility], which was performed in the clinic without complication.  

RTC: 8 wks for DFE with OCT OU (mac and RNFL)/poss Eylea OD x BRVO

## 2023-05-15 NOTE — IMPRESSION/PLAN
Impression: Glaucoma: H40.9. OU. Status: Controlled. Plan: IOP stable on latanoprost. No progression of ON appearance. Refill provided. Cont. care with Dr. Kimber Caba.   RNFL ~71 OU with tr thinning 3/20/2023

## 2023-05-15 NOTE — IMPRESSION/PLAN
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
-s/p repair OS (Dr. Gaby Hinojosa) for asymmetry Plan: Exam demonstrates dermatochalasis with improved ptosis OU. Patient remains very happy with the results.

## 2023-07-10 ENCOUNTER — OFFICE VISIT (OUTPATIENT)
Dept: URBAN - METROPOLITAN AREA CLINIC 36 | Facility: CLINIC | Age: 78
End: 2023-07-10
Payer: MEDICARE

## 2023-07-10 DIAGNOSIS — H02.409 PTOSIS OF EYELID: ICD-10-CM

## 2023-07-10 DIAGNOSIS — H34.8310 TRIB RTNL VEIN OCCLUSION, RIGHT EYE, WITH MACULAR EDEMA: Primary | ICD-10-CM

## 2023-07-10 DIAGNOSIS — H40.9 GLAUCOMA: ICD-10-CM

## 2023-07-10 DIAGNOSIS — Z96.1 PRESENCE OF INTRAOCULAR LENS: ICD-10-CM

## 2023-07-10 DIAGNOSIS — H04.123 DRY EYE SYNDROME OF BILATERAL LACRIMAL GLANDS: ICD-10-CM

## 2023-07-10 PROCEDURE — 67028 INJECTION EYE DRUG: CPT | Performed by: OPHTHALMOLOGY

## 2023-07-10 PROCEDURE — 92133 CPTRZD OPH DX IMG PST SGM ON: CPT | Performed by: OPHTHALMOLOGY

## 2023-07-10 PROCEDURE — 92134 CPTRZ OPH DX IMG PST SGM RTA: CPT | Performed by: OPHTHALMOLOGY

## 2023-07-10 PROCEDURE — 99214 OFFICE O/P EST MOD 30 MIN: CPT | Performed by: OPHTHALMOLOGY

## 2023-07-10 ASSESSMENT — INTRAOCULAR PRESSURE
OS: 14
OD: 13

## 2023-07-10 NOTE — IMPRESSION/PLAN
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
-s/p repair OS (Dr. Fritz Shipley) for asymmetry Plan: Exam demonstrates dermatochalasis with improved ptosis OU. Patient remains very happy with the results.

## 2023-07-10 NOTE — IMPRESSION/PLAN
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 OD. Status: Symptomatic.
-s/p Avastin last 02/14/18
-s/p Eylea last 05/15/2023 Plan: Exam and OCT reveal improved but persistent CME today s/p Eylea tapering from 10 wks to 8 wk interval. Discussed R/B/A of tx vs observation. Patient elects continuing with Ign[facility]mid today and will maintain 8 wks to prevent recurrence. R/B/A's discussed. Patient elects to proceed. Intravitreal Eylea injected OD today without complication RTC: 8 wks for DFE with OCT OU (mac)/poss Eylea OD x BRVO

## 2023-07-10 NOTE — IMPRESSION/PLAN
Impression: Glaucoma: H40.9. OU. Status: Controlled. Plan: IOP improved on latanoprost. No progression of ON appearance. Refill provided. Cont. care with Dr. Yessica Brown. RNFL was ~71 OU with tr thinning 3/20/2023;  On follow up today 7/2023 68/74

## 2023-09-14 ENCOUNTER — OFFICE VISIT (OUTPATIENT)
Dept: URBAN - METROPOLITAN AREA CLINIC 35 | Facility: CLINIC | Age: 78
End: 2023-09-14
Payer: MEDICARE

## 2023-09-14 DIAGNOSIS — H34.8310 TRIBUTARY (BRANCH) RETINAL VEIN OCCLUSION, RIGHT EYE, WITH MACULAR EDEMA: Primary | ICD-10-CM

## 2023-09-14 DIAGNOSIS — H40.9 GLAUCOMA: ICD-10-CM

## 2023-09-14 PROCEDURE — 67028 INJECTION EYE DRUG: CPT | Performed by: OPHTHALMOLOGY

## 2023-09-14 PROCEDURE — 92134 CPTRZ OPH DX IMG PST SGM RTA: CPT | Performed by: OPHTHALMOLOGY

## 2023-09-14 ASSESSMENT — INTRAOCULAR PRESSURE
OD: 13
OS: 15

## 2023-11-16 DIAGNOSIS — H34.8310 TRIBUTARY (BRANCH) RETINAL VEIN OCCLUSION, RIGHT EYE, WITH MACULAR EDEMA: Primary | ICD-10-CM

## 2023-11-16 PROCEDURE — 92134 CPTRZ OPH DX IMG PST SGM RTA: CPT | Performed by: OPHTHALMOLOGY

## 2023-11-16 PROCEDURE — 67028 INJECTION EYE DRUG: CPT | Performed by: OPHTHALMOLOGY

## 2024-04-04 ENCOUNTER — OFFICE VISIT (OUTPATIENT)
Dept: URBAN - METROPOLITAN AREA CLINIC 35 | Facility: CLINIC | Age: 79
End: 2024-04-04
Payer: MEDICARE

## 2024-04-04 DIAGNOSIS — H34.8310 TRIB RTNL VEIN OCCLUSION, RIGHT EYE, WITH MACULAR EDEMA: Primary | ICD-10-CM

## 2024-04-04 DIAGNOSIS — H40.9 GLAUCOMA: ICD-10-CM

## 2024-04-04 PROCEDURE — 67028 INJECTION EYE DRUG: CPT | Performed by: OPHTHALMOLOGY

## 2024-04-04 PROCEDURE — 99213 OFFICE O/P EST LOW 20 MIN: CPT | Performed by: OPHTHALMOLOGY

## 2024-04-04 PROCEDURE — 92134 CPTRZ OPH DX IMG PST SGM RTA: CPT | Performed by: OPHTHALMOLOGY

## 2024-04-04 ASSESSMENT — INTRAOCULAR PRESSURE
OS: 12
OD: 14

## 2024-08-08 ENCOUNTER — OFFICE VISIT (OUTPATIENT)
Dept: URBAN - METROPOLITAN AREA CLINIC 35 | Facility: CLINIC | Age: 79
End: 2024-08-08
Payer: MEDICARE

## 2024-08-08 DIAGNOSIS — H34.8310 TRIBUTARY (BRANCH) RETINAL VEIN OCCLUSION, RIGHT EYE, WITH MACULAR EDEMA: Primary | ICD-10-CM

## 2024-08-08 PROCEDURE — 92134 CPTRZ OPH DX IMG PST SGM RTA: CPT | Performed by: OPHTHALMOLOGY

## 2024-08-08 PROCEDURE — 67028 INJECTION EYE DRUG: CPT | Performed by: OPHTHALMOLOGY

## 2024-08-08 ASSESSMENT — INTRAOCULAR PRESSURE
OS: 11
OD: 12

## 2024-12-12 ENCOUNTER — OFFICE VISIT (OUTPATIENT)
Dept: URBAN - METROPOLITAN AREA CLINIC 35 | Facility: CLINIC | Age: 79
End: 2024-12-12
Payer: MEDICARE

## 2024-12-12 DIAGNOSIS — H40.9 GLAUCOMA: ICD-10-CM

## 2024-12-12 DIAGNOSIS — H34.8310 TRIBUTARY (BRANCH) RETINAL VEIN OCCLUSION, RIGHT EYE, WITH MACULAR EDEMA: Primary | ICD-10-CM

## 2024-12-12 PROCEDURE — 67028 INJECTION EYE DRUG: CPT | Performed by: OPHTHALMOLOGY

## 2024-12-12 PROCEDURE — 92134 CPTRZ OPH DX IMG PST SGM RTA: CPT | Performed by: OPHTHALMOLOGY

## 2024-12-12 PROCEDURE — 99213 OFFICE O/P EST LOW 20 MIN: CPT | Performed by: OPHTHALMOLOGY

## 2024-12-12 ASSESSMENT — INTRAOCULAR PRESSURE
OD: 14
OS: 15

## 2025-04-10 ENCOUNTER — OFFICE VISIT (OUTPATIENT)
Dept: URBAN - METROPOLITAN AREA CLINIC 35 | Facility: CLINIC | Age: 80
End: 2025-04-10
Payer: MEDICARE

## 2025-04-10 DIAGNOSIS — H34.8310 TRIBUTARY (BRANCH) RETINAL VEIN OCCLUSION, RIGHT EYE, WITH MACULAR EDEMA: Primary | ICD-10-CM

## 2025-04-10 PROCEDURE — 92134 CPTRZ OPH DX IMG PST SGM RTA: CPT | Performed by: OPHTHALMOLOGY

## 2025-04-10 PROCEDURE — 67028 INJECTION EYE DRUG: CPT | Performed by: OPHTHALMOLOGY

## 2025-04-10 ASSESSMENT — INTRAOCULAR PRESSURE
OS: 16
OD: 15

## 2025-08-14 ENCOUNTER — OFFICE VISIT (OUTPATIENT)
Dept: URBAN - METROPOLITAN AREA CLINIC 35 | Facility: CLINIC | Age: 80
End: 2025-08-14
Payer: MEDICARE

## 2025-08-14 DIAGNOSIS — H40.9 GLAUCOMA: ICD-10-CM

## 2025-08-14 DIAGNOSIS — H34.8310 TRIBUTARY (BRANCH) RETINAL VEIN OCCLUSION, RIGHT EYE, WITH MACULAR EDEMA: Primary | ICD-10-CM

## 2025-08-14 PROCEDURE — 67028 INJECTION EYE DRUG: CPT | Performed by: OPHTHALMOLOGY

## 2025-08-14 PROCEDURE — 99213 OFFICE O/P EST LOW 20 MIN: CPT | Performed by: OPHTHALMOLOGY

## 2025-08-14 PROCEDURE — 92134 CPTRZ OPH DX IMG PST SGM RTA: CPT | Performed by: OPHTHALMOLOGY

## 2025-08-14 ASSESSMENT — INTRAOCULAR PRESSURE
OS: 9
OD: 11